# Patient Record
Sex: MALE | Race: WHITE | NOT HISPANIC OR LATINO | Employment: OTHER | URBAN - METROPOLITAN AREA
[De-identification: names, ages, dates, MRNs, and addresses within clinical notes are randomized per-mention and may not be internally consistent; named-entity substitution may affect disease eponyms.]

---

## 2017-05-04 ENCOUNTER — APPOINTMENT (OUTPATIENT)
Dept: PHYSICAL THERAPY | Facility: CLINIC | Age: 77
End: 2017-05-04
Payer: MEDICARE

## 2017-05-04 PROCEDURE — G8978 MOBILITY CURRENT STATUS: HCPCS

## 2017-05-04 PROCEDURE — 97163 PT EVAL HIGH COMPLEX 45 MIN: CPT

## 2017-05-04 PROCEDURE — G8979 MOBILITY GOAL STATUS: HCPCS

## 2017-05-10 ENCOUNTER — APPOINTMENT (OUTPATIENT)
Dept: PHYSICAL THERAPY | Facility: CLINIC | Age: 77
End: 2017-05-10
Payer: MEDICARE

## 2017-05-10 PROCEDURE — 97112 NEUROMUSCULAR REEDUCATION: CPT

## 2017-05-10 PROCEDURE — 97110 THERAPEUTIC EXERCISES: CPT

## 2017-05-12 ENCOUNTER — APPOINTMENT (OUTPATIENT)
Dept: PHYSICAL THERAPY | Facility: CLINIC | Age: 77
End: 2017-05-12
Payer: MEDICARE

## 2017-05-12 PROCEDURE — 97112 NEUROMUSCULAR REEDUCATION: CPT

## 2017-05-12 PROCEDURE — 97110 THERAPEUTIC EXERCISES: CPT

## 2017-05-15 ENCOUNTER — APPOINTMENT (OUTPATIENT)
Dept: PHYSICAL THERAPY | Facility: CLINIC | Age: 77
End: 2017-05-15
Payer: MEDICARE

## 2017-05-15 PROCEDURE — 97112 NEUROMUSCULAR REEDUCATION: CPT

## 2017-05-15 PROCEDURE — 97110 THERAPEUTIC EXERCISES: CPT

## 2017-05-17 ENCOUNTER — APPOINTMENT (OUTPATIENT)
Dept: PHYSICAL THERAPY | Facility: CLINIC | Age: 77
End: 2017-05-17
Payer: MEDICARE

## 2017-05-17 PROCEDURE — 97112 NEUROMUSCULAR REEDUCATION: CPT

## 2017-05-17 PROCEDURE — 97110 THERAPEUTIC EXERCISES: CPT

## 2017-05-22 ENCOUNTER — APPOINTMENT (OUTPATIENT)
Dept: PHYSICAL THERAPY | Facility: CLINIC | Age: 77
End: 2017-05-22
Payer: MEDICARE

## 2017-05-24 ENCOUNTER — APPOINTMENT (OUTPATIENT)
Dept: PHYSICAL THERAPY | Facility: CLINIC | Age: 77
End: 2017-05-24
Payer: MEDICARE

## 2017-06-05 ENCOUNTER — APPOINTMENT (OUTPATIENT)
Dept: PHYSICAL THERAPY | Facility: CLINIC | Age: 77
End: 2017-06-05
Payer: MEDICARE

## 2017-06-05 PROCEDURE — G8979 MOBILITY GOAL STATUS: HCPCS

## 2017-06-05 PROCEDURE — G8978 MOBILITY CURRENT STATUS: HCPCS

## 2017-06-05 PROCEDURE — 97112 NEUROMUSCULAR REEDUCATION: CPT

## 2017-06-05 PROCEDURE — 97110 THERAPEUTIC EXERCISES: CPT

## 2017-06-20 ENCOUNTER — GENERIC CONVERSION - ENCOUNTER (OUTPATIENT)
Dept: OTHER | Facility: OTHER | Age: 77
End: 2017-06-20

## 2017-06-28 ENCOUNTER — GENERIC CONVERSION - ENCOUNTER (OUTPATIENT)
Dept: OTHER | Facility: OTHER | Age: 77
End: 2017-06-28

## 2017-08-17 ENCOUNTER — APPOINTMENT (OUTPATIENT)
Dept: PHYSICAL THERAPY | Facility: CLINIC | Age: 77
End: 2017-08-17
Payer: MEDICARE

## 2017-08-17 PROCEDURE — 97163 PT EVAL HIGH COMPLEX 45 MIN: CPT | Performed by: PHYSICAL THERAPIST

## 2017-08-17 PROCEDURE — G8991 OTHER PT/OT GOAL STATUS: HCPCS | Performed by: PHYSICAL THERAPIST

## 2017-08-17 PROCEDURE — G8990 OTHER PT/OT CURRENT STATUS: HCPCS | Performed by: PHYSICAL THERAPIST

## 2017-08-21 ENCOUNTER — APPOINTMENT (OUTPATIENT)
Dept: PHYSICAL THERAPY | Facility: CLINIC | Age: 77
End: 2017-08-21
Payer: MEDICARE

## 2017-08-24 ENCOUNTER — APPOINTMENT (OUTPATIENT)
Dept: PHYSICAL THERAPY | Facility: CLINIC | Age: 77
End: 2017-08-24
Payer: MEDICARE

## 2017-09-13 ENCOUNTER — ALLSCRIPTS OFFICE VISIT (OUTPATIENT)
Dept: OTHER | Facility: OTHER | Age: 77
End: 2017-09-13

## 2017-09-18 ENCOUNTER — GENERIC CONVERSION - ENCOUNTER (OUTPATIENT)
Dept: OTHER | Facility: OTHER | Age: 77
End: 2017-09-18

## 2018-01-14 VITALS
BODY MASS INDEX: 21 KG/M2 | DIASTOLIC BLOOD PRESSURE: 87 MMHG | HEIGHT: 71 IN | RESPIRATION RATE: 16 BRPM | HEART RATE: 85 BPM | TEMPERATURE: 98.3 F | SYSTOLIC BLOOD PRESSURE: 149 MMHG | WEIGHT: 150 LBS

## 2018-01-16 NOTE — MISCELLANEOUS
Message  This pt was a recent consult with Dr Sanchez Akbar and at present to be seen again PRN  Pt's wife reports his neurologist would like the pt to have a spinal tap and she questioned if this office performed this  Informed her we do not they are performed in IR  Pt is uinsure where to go  Provided her with Central Schedule number to call to inquire if [facility] has this department        Signatures   Electronically signed by : Riri Montoya, ; Sep 18 2017  3:34PM EST                       (Author)

## 2018-09-01 ENCOUNTER — HOSPITAL ENCOUNTER (OUTPATIENT)
Facility: HOSPITAL | Age: 78
Setting detail: OBSERVATION
Discharge: NURSING, REHAB OR LONG-TERM CARE | End: 2018-09-02
Attending: EMERGENCY MEDICINE | Admitting: FAMILY MEDICINE
Payer: MEDICARE

## 2018-09-01 ENCOUNTER — APPOINTMENT (EMERGENCY)
Dept: RADIOLOGY | Facility: HOSPITAL | Age: 78
End: 2018-09-01
Payer: MEDICARE

## 2018-09-01 DIAGNOSIS — R53.1 WEAKNESS: Primary | ICD-10-CM

## 2018-09-01 DIAGNOSIS — Z98.2 S/P VP SHUNT: ICD-10-CM

## 2018-09-01 LAB
ALBUMIN SERPL BCP-MCNC: 3 G/DL (ref 3.5–5)
ALP SERPL-CCNC: 92 U/L (ref 46–116)
ALT SERPL W P-5'-P-CCNC: 25 U/L (ref 12–78)
ANION GAP SERPL CALCULATED.3IONS-SCNC: 3 MMOL/L (ref 4–13)
APTT PPP: 25 SECONDS (ref 24–33)
AST SERPL W P-5'-P-CCNC: 21 U/L (ref 5–45)
BASOPHILS # BLD AUTO: 0.03 THOUSANDS/ΜL (ref 0–0.1)
BASOPHILS NFR BLD AUTO: 1 % (ref 0–1)
BILIRUB SERPL-MCNC: 0.6 MG/DL (ref 0.2–1)
BUN SERPL-MCNC: 23 MG/DL (ref 5–25)
CALCIUM SERPL-MCNC: 9.1 MG/DL (ref 8.3–10.1)
CHLORIDE SERPL-SCNC: 105 MMOL/L (ref 100–108)
CO2 SERPL-SCNC: 32 MMOL/L (ref 21–32)
CREAT SERPL-MCNC: 1.11 MG/DL (ref 0.6–1.3)
EOSINOPHIL # BLD AUTO: 0.1 THOUSAND/ΜL (ref 0–0.61)
EOSINOPHIL NFR BLD AUTO: 2 % (ref 0–6)
ERYTHROCYTE [DISTWIDTH] IN BLOOD BY AUTOMATED COUNT: 13.2 % (ref 11.6–15.1)
GFR SERPL CREATININE-BSD FRML MDRD: 64 ML/MIN/1.73SQ M
GLUCOSE SERPL-MCNC: 142 MG/DL (ref 65–140)
GLUCOSE SERPL-MCNC: 170 MG/DL (ref 65–140)
HCT VFR BLD AUTO: 41.5 % (ref 36.5–49.3)
HGB BLD-MCNC: 13 G/DL (ref 12–17)
IMM GRANULOCYTES # BLD AUTO: 0.03 THOUSAND/UL (ref 0–0.2)
IMM GRANULOCYTES NFR BLD AUTO: 1 % (ref 0–2)
INR PPP: 0.98 (ref 0.86–1.16)
LYMPHOCYTES # BLD AUTO: 0.77 THOUSANDS/ΜL (ref 0.6–4.47)
LYMPHOCYTES NFR BLD AUTO: 13 % (ref 14–44)
MCH RBC QN AUTO: 31 PG (ref 26.8–34.3)
MCHC RBC AUTO-ENTMCNC: 31.3 G/DL (ref 31.4–37.4)
MCV RBC AUTO: 99 FL (ref 82–98)
MONOCYTES # BLD AUTO: 0.57 THOUSAND/ΜL (ref 0.17–1.22)
MONOCYTES NFR BLD AUTO: 10 % (ref 4–12)
NEUTROPHILS # BLD AUTO: 4.33 THOUSANDS/ΜL (ref 1.85–7.62)
NEUTS SEG NFR BLD AUTO: 73 % (ref 43–75)
NRBC BLD AUTO-RTO: 0 /100 WBCS
PLATELET # BLD AUTO: 227 THOUSANDS/UL (ref 149–390)
PMV BLD AUTO: 8.8 FL (ref 8.9–12.7)
POTASSIUM SERPL-SCNC: 4.1 MMOL/L (ref 3.5–5.3)
PROT SERPL-MCNC: 6.5 G/DL (ref 6.4–8.2)
PROTHROMBIN TIME: 10.3 SECONDS (ref 9.4–11.7)
RBC # BLD AUTO: 4.19 MILLION/UL (ref 3.88–5.62)
SODIUM SERPL-SCNC: 140 MMOL/L (ref 136–145)
WBC # BLD AUTO: 5.83 THOUSAND/UL (ref 4.31–10.16)

## 2018-09-01 PROCEDURE — 93005 ELECTROCARDIOGRAM TRACING: CPT

## 2018-09-01 PROCEDURE — 99219 PR INITIAL OBSERVATION CARE/DAY 50 MINUTES: CPT | Performed by: NURSE PRACTITIONER

## 2018-09-01 PROCEDURE — 80053 COMPREHEN METABOLIC PANEL: CPT | Performed by: EMERGENCY MEDICINE

## 2018-09-01 PROCEDURE — 36415 COLL VENOUS BLD VENIPUNCTURE: CPT | Performed by: EMERGENCY MEDICINE

## 2018-09-01 PROCEDURE — 85025 COMPLETE CBC W/AUTO DIFF WBC: CPT | Performed by: EMERGENCY MEDICINE

## 2018-09-01 PROCEDURE — 85610 PROTHROMBIN TIME: CPT | Performed by: EMERGENCY MEDICINE

## 2018-09-01 PROCEDURE — 82948 REAGENT STRIP/BLOOD GLUCOSE: CPT

## 2018-09-01 PROCEDURE — 85730 THROMBOPLASTIN TIME PARTIAL: CPT | Performed by: EMERGENCY MEDICINE

## 2018-09-01 PROCEDURE — 70450 CT HEAD/BRAIN W/O DYE: CPT

## 2018-09-01 RX ORDER — SACCHAROMYCES BOULARDII 250 MG
250 CAPSULE ORAL 2 TIMES DAILY
COMMUNITY

## 2018-09-01 RX ORDER — ACETAMINOPHEN 325 MG/1
650 TABLET ORAL EVERY 6 HOURS PRN
COMMUNITY

## 2018-09-01 RX ORDER — LIOTHYRONINE SODIUM 5 UG/1
5 TABLET ORAL DAILY
COMMUNITY

## 2018-09-01 RX ORDER — LOSARTAN POTASSIUM 25 MG/1
25 TABLET ORAL DAILY
COMMUNITY

## 2018-09-02 VITALS
TEMPERATURE: 98.4 F | SYSTOLIC BLOOD PRESSURE: 182 MMHG | RESPIRATION RATE: 18 BRPM | HEIGHT: 73 IN | OXYGEN SATURATION: 96 % | BODY MASS INDEX: 18.67 KG/M2 | WEIGHT: 140.87 LBS | DIASTOLIC BLOOD PRESSURE: 86 MMHG | HEART RATE: 78 BPM

## 2018-09-02 PROBLEM — M51.36 DEGENERATIVE LUMBAR DISC: Status: ACTIVE | Noted: 2017-09-13

## 2018-09-02 PROBLEM — R53.1 WEAKNESS: Status: ACTIVE | Noted: 2018-09-02

## 2018-09-02 PROBLEM — A49.8 PSEUDOMONAS INFECTION: Status: ACTIVE | Noted: 2018-07-31

## 2018-09-02 PROBLEM — R29.898 DROPPED HEAD SYNDROME: Status: ACTIVE | Noted: 2018-02-14

## 2018-09-02 PROBLEM — M48.02 CERVICAL SPINAL STENOSIS: Status: ACTIVE | Noted: 2017-09-13

## 2018-09-02 PROBLEM — I10 HYPERTENSION: Status: ACTIVE | Noted: 2017-09-13

## 2018-09-02 PROBLEM — G91.2 NPH (NORMAL PRESSURE HYDROCEPHALUS) (HCC): Status: ACTIVE | Noted: 2018-07-23

## 2018-09-02 PROBLEM — E03.9 HYPOTHYROIDISM: Status: ACTIVE | Noted: 2018-07-23

## 2018-09-02 PROBLEM — G20 PRIMARY PARKINSONISM (HCC): Status: ACTIVE | Noted: 2018-02-14

## 2018-09-02 LAB
ANION GAP SERPL CALCULATED.3IONS-SCNC: 5 MMOL/L (ref 4–13)
BILIRUB UR QL STRIP: NEGATIVE
BUN SERPL-MCNC: 18 MG/DL (ref 5–25)
CALCIUM SERPL-MCNC: 9 MG/DL (ref 8.3–10.1)
CHLORIDE SERPL-SCNC: 105 MMOL/L (ref 100–108)
CHOLEST SERPL-MCNC: 169 MG/DL (ref 50–200)
CLARITY UR: CLEAR
CO2 SERPL-SCNC: 30 MMOL/L (ref 21–32)
COLOR UR: YELLOW
CREAT SERPL-MCNC: 0.81 MG/DL (ref 0.6–1.3)
ERYTHROCYTE [DISTWIDTH] IN BLOOD BY AUTOMATED COUNT: 12.8 % (ref 11.6–15.1)
EST. AVERAGE GLUCOSE BLD GHB EST-MCNC: 117 MG/DL
GFR SERPL CREATININE-BSD FRML MDRD: 86 ML/MIN/1.73SQ M
GLUCOSE SERPL-MCNC: 104 MG/DL (ref 65–140)
GLUCOSE UR STRIP-MCNC: NEGATIVE MG/DL
HBA1C MFR BLD: 5.7 % (ref 4.2–6.3)
HCT VFR BLD AUTO: 39.7 % (ref 36.5–49.3)
HDLC SERPL-MCNC: 74 MG/DL (ref 40–60)
HGB BLD-MCNC: 12.6 G/DL (ref 12–17)
HGB UR QL STRIP.AUTO: NEGATIVE
KETONES UR STRIP-MCNC: NEGATIVE MG/DL
LDLC SERPL CALC-MCNC: 89 MG/DL (ref 0–100)
LEUKOCYTE ESTERASE UR QL STRIP: NEGATIVE
MCH RBC QN AUTO: 30.8 PG (ref 26.8–34.3)
MCHC RBC AUTO-ENTMCNC: 31.7 G/DL (ref 31.4–37.4)
MCV RBC AUTO: 97 FL (ref 82–98)
NITRITE UR QL STRIP: NEGATIVE
PH UR STRIP.AUTO: 7.5 [PH] (ref 5–9)
PLATELET # BLD AUTO: 222 THOUSANDS/UL (ref 149–390)
PMV BLD AUTO: 9.1 FL (ref 8.9–12.7)
POTASSIUM SERPL-SCNC: 3.8 MMOL/L (ref 3.5–5.3)
PROT UR STRIP-MCNC: NEGATIVE MG/DL
RBC # BLD AUTO: 4.09 MILLION/UL (ref 3.88–5.62)
SODIUM SERPL-SCNC: 140 MMOL/L (ref 136–145)
SP GR UR STRIP.AUTO: 1.02 (ref 1–1.03)
TRIGL SERPL-MCNC: 30 MG/DL
UROBILINOGEN UR QL STRIP.AUTO: 1 E.U./DL
WBC # BLD AUTO: 5.61 THOUSAND/UL (ref 4.31–10.16)

## 2018-09-02 PROCEDURE — 83036 HEMOGLOBIN GLYCOSYLATED A1C: CPT | Performed by: NURSE PRACTITIONER

## 2018-09-02 PROCEDURE — 81003 URINALYSIS AUTO W/O SCOPE: CPT | Performed by: NURSE PRACTITIONER

## 2018-09-02 PROCEDURE — 80048 BASIC METABOLIC PNL TOTAL CA: CPT | Performed by: NURSE PRACTITIONER

## 2018-09-02 PROCEDURE — 92610 EVALUATE SWALLOWING FUNCTION: CPT

## 2018-09-02 PROCEDURE — G8997 SWALLOW GOAL STATUS: HCPCS

## 2018-09-02 PROCEDURE — 97163 PT EVAL HIGH COMPLEX 45 MIN: CPT

## 2018-09-02 PROCEDURE — 99285 EMERGENCY DEPT VISIT HI MDM: CPT

## 2018-09-02 PROCEDURE — 97110 THERAPEUTIC EXERCISES: CPT

## 2018-09-02 PROCEDURE — G8979 MOBILITY GOAL STATUS: HCPCS

## 2018-09-02 PROCEDURE — 85027 COMPLETE CBC AUTOMATED: CPT | Performed by: NURSE PRACTITIONER

## 2018-09-02 PROCEDURE — G8978 MOBILITY CURRENT STATUS: HCPCS

## 2018-09-02 PROCEDURE — G8988 SELF CARE GOAL STATUS: HCPCS

## 2018-09-02 PROCEDURE — 97167 OT EVAL HIGH COMPLEX 60 MIN: CPT

## 2018-09-02 PROCEDURE — 87081 CULTURE SCREEN ONLY: CPT | Performed by: NURSE PRACTITIONER

## 2018-09-02 PROCEDURE — 80061 LIPID PANEL: CPT | Performed by: NURSE PRACTITIONER

## 2018-09-02 PROCEDURE — G8996 SWALLOW CURRENT STATUS: HCPCS

## 2018-09-02 PROCEDURE — G8987 SELF CARE CURRENT STATUS: HCPCS

## 2018-09-02 PROCEDURE — 99217 PR OBSERVATION CARE DISCHARGE MANAGEMENT: CPT | Performed by: FAMILY MEDICINE

## 2018-09-02 PROCEDURE — 97535 SELF CARE MNGMENT TRAINING: CPT

## 2018-09-02 RX ORDER — HEPARIN SODIUM 5000 [USP'U]/ML
5000 INJECTION, SOLUTION INTRAVENOUS; SUBCUTANEOUS EVERY 8 HOURS SCHEDULED
Status: DISCONTINUED | OUTPATIENT
Start: 2018-09-02 | End: 2018-09-02

## 2018-09-02 RX ORDER — ACETAMINOPHEN 325 MG/1
650 TABLET ORAL EVERY 6 HOURS PRN
Status: DISCONTINUED | OUTPATIENT
Start: 2018-09-02 | End: 2018-09-02 | Stop reason: HOSPADM

## 2018-09-02 RX ORDER — ATORVASTATIN CALCIUM 40 MG/1
40 TABLET, FILM COATED ORAL EVERY EVENING
Status: DISCONTINUED | OUTPATIENT
Start: 2018-09-02 | End: 2018-09-02 | Stop reason: HOSPADM

## 2018-09-02 RX ORDER — UBIDECARENONE 75 MG
100 CAPSULE ORAL DAILY
Status: DISCONTINUED | OUTPATIENT
Start: 2018-09-02 | End: 2018-09-02 | Stop reason: HOSPADM

## 2018-09-02 RX ORDER — LIOTHYRONINE SODIUM 5 UG/1
5 TABLET ORAL DAILY
Status: DISCONTINUED | OUTPATIENT
Start: 2018-09-02 | End: 2018-09-02 | Stop reason: HOSPADM

## 2018-09-02 RX ORDER — ASPIRIN 81 MG/1
81 TABLET, CHEWABLE ORAL DAILY
Status: DISCONTINUED | OUTPATIENT
Start: 2018-09-02 | End: 2018-09-02

## 2018-09-02 RX ORDER — SACCHAROMYCES BOULARDII 250 MG
250 CAPSULE ORAL 2 TIMES DAILY
Status: DISCONTINUED | OUTPATIENT
Start: 2018-09-02 | End: 2018-09-02 | Stop reason: HOSPADM

## 2018-09-02 RX ORDER — ASPIRIN 81 MG/1
81 TABLET ORAL DAILY
Qty: 30 TABLET | Refills: 0 | Status: SHIPPED | OUTPATIENT
Start: 2018-09-02

## 2018-09-02 RX ORDER — LOSARTAN POTASSIUM 25 MG/1
25 TABLET ORAL ONCE
Status: COMPLETED | OUTPATIENT
Start: 2018-09-02 | End: 2018-09-02

## 2018-09-02 RX ORDER — HEPARIN SODIUM 5000 [USP'U]/ML
5000 INJECTION, SOLUTION INTRAVENOUS; SUBCUTANEOUS EVERY 12 HOURS SCHEDULED
Status: DISCONTINUED | OUTPATIENT
Start: 2018-09-02 | End: 2018-09-02 | Stop reason: HOSPADM

## 2018-09-02 RX ORDER — LOSARTAN POTASSIUM 25 MG/1
25 TABLET ORAL DAILY
Status: DISCONTINUED | OUTPATIENT
Start: 2018-09-02 | End: 2018-09-02 | Stop reason: HOSPADM

## 2018-09-02 RX ORDER — LEVOTHYROXINE SODIUM 0.05 MG/1
50 TABLET ORAL
Status: DISCONTINUED | OUTPATIENT
Start: 2018-09-02 | End: 2018-09-02 | Stop reason: HOSPADM

## 2018-09-02 RX ADMIN — LOSARTAN POTASSIUM 25 MG: 25 TABLET, FILM COATED ORAL at 11:58

## 2018-09-02 RX ADMIN — LIOTHYRONINE SODIUM 5 MCG: 5 TABLET ORAL at 09:18

## 2018-09-02 RX ADMIN — LOSARTAN POTASSIUM 25 MG: 25 TABLET ORAL at 05:25

## 2018-09-02 RX ADMIN — Medication 250 MG: at 09:18

## 2018-09-02 RX ADMIN — LEVOTHYROXINE SODIUM 50 MCG: 50 TABLET ORAL at 05:25

## 2018-09-02 RX ADMIN — HEPARIN SODIUM 5000 UNITS: 5000 INJECTION, SOLUTION INTRAVENOUS; SUBCUTANEOUS at 09:18

## 2018-09-02 RX ADMIN — VITAM B12 100 MCG: 100 TAB at 09:18

## 2018-09-02 NOTE — PLAN OF CARE
DISCHARGE PLANNING - CARE MANAGEMENT     Discharge to post-acute care or home with appropriate resources Adequate for Discharge        Potential for Falls     Patient will remain free of falls Adequate for Discharge        Prexisting or High Potential for Compromised Skin Integrity     Skin integrity is maintained or improved Adequate for Discharge

## 2018-09-02 NOTE — [place] UNIVERSAL TRANSFER FORM
[place] UNIV[place]AL TRANSFER FORM  (ALL ITEMS MUST BE COMPLETED)    1  TRANSFER FROM: [address]      TRANSFER TO: [facility]    2  DATE OF TRANSFER: 9/2/2018                        TIME OF TRANSFER: 1630    3  PATIENT NAME: Patricia Stern      YOB: 1940                             GENDER: male    4  LANGUAGE:   English    5  PHYSICIAN NAME:  Yesi Rosales DO                   PHONE: [phone]    6  CODE STATUS: Level 1 - Full Code        Out of Hospital DNR Attached: No    7  :                                      :  Extended Emergency Contact Information  Primary Emergency Contact: Adelita Cheek  Address: [place] [address] [address] Phone: [phone]  Mobile Phone: [phone]  Relation: Kyung Plasencia [facility] Representative/Proxy:  No           Legal Guardian:  No             NAME OF:           HEALTH CARE REPRESENTATIVE/PROXY:                                         OR           LEGAL GUARDIAN, IF NOT :                                               PHONE:  (Day)           (Night)                        (Cell)    8  REASON FOR TRANSFER: (Must include brief medical history and recent changes in physical function or cognition ) return to rehab            V/S: /85 (BP Location: Right arm)   Pulse 86   Temp 98 2 °F (36 8 °C) (Axillary)   Resp 18   Ht 6' 1" (1 854 m)   Wt 63 9 kg (140 lb 14 oz)   SpO2 97%   BMI 18 59 kg/m²           PAIN: None    9  PRIMARY DIAGNOSIS: Weakness      Secondary Diagnosis:         Pacemaker: No      Internal Defib: No          Mental Health Diagnosis (if Applicable):    10  RESTRAINTS: No     11  RESPIRATORY NEEDS: None    12  ISOLATION/PRECAUTION: None    13  ALLERGY: Propranolol    14  SENSORY:       Hearing Poor    15  SKIN CONDITION: Yes:  Pressure    16  DIET: Regular    17  IV ACCESS: None    18  PERSONAL ITEMS SENT WITH PATIENT: Glasses    19  ATTACHED DOCUMENTS: MUST ATTACH CURRENT MEDICATION INFORMATION Face Sheet, MAR and Discharge Summary    20  AT RISK ALERTS:Falls and Pressure Ulcer        HARM TO: N/A    21  WEIGHT BEARING STATUS:         Left Leg: None        Right Leg: None    22  MENTAL STATUS:Forgetful and Disoriented    23  FUNCTION:        Walk: With Help        Transfer: With Help        Toilet: With Help        Feed: With Help    24  IMMUNIZATIONS/SCREENING:     There is no immunization history on file for this patient  25  BOWEL: Incontinent     26  BLADDER: Incontinent    27   SENDING FACILITY CONTACT: [facility]                   Title: RN        Unit: [address]        Phone: [phone] (if known):        Title:        Unit:         Phone:         FORM PREFILLED BY (if applicable)       Title:       Unit:        Phone:         FORM COMPLETED BY Jung Mcmahan RN      Title: RN      Phone: [phone]

## 2018-09-02 NOTE — ASSESSMENT & PLAN NOTE
· Status post  shunt in July  · Neurosurgery is Dr Valeriy Ruiz from [facility] Neurosurgical specialist  · As per CT,  shunt is in place and no evidence of hydrocephalus at this time

## 2018-09-02 NOTE — PLAN OF CARE
Problem: DISCHARGE PLANNING - CARE MANAGEMENT  Goal: Discharge to post-acute care or home with appropriate resources  INTERVENTIONS:  - Conduct assessment to determine patient/family and health care team treatment goals, and need for post-acute services based on payer coverage, community resources, and patient preferences, and barriers to discharge  - Address psychosocial, clinical, and financial barriers to discharge as identified in assessment in conjunction with the patient/family and health care team  - Arrange appropriate level of post-acute services according to patient's   needs and preference and payer coverage in collaboration with the physician and health care team  - Communicate with and update the patient/family, physician, and health care team regarding progress on the discharge plan  - Arrange appropriate transportation to post-acute venues  Return to [address] [place] [address] for SNF/STR  Outcome: Adequate for Discharge

## 2018-09-02 NOTE — DISCHARGE SUMMARY
Discharge Summary - [facility] Internal Medicine    Patient Information: Maximiliano Paris  69 y o  male MRN: [id number]  Unit/Bed#: [address] [place] [address] Encounter: [id number]    Discharging Physician / Practitioner: Ella Stanford DO  PCP: Emily Kaplan MD  Admission Date: 9/1/2018  Discharge Date: 09/02/18    Reason for Admission: Weakness - Generalized (pt sent in from NH wife told staff patients hand grasp was weaker than normal, started about 40 mins ago, wife states patient seems back to baseline now )      Discharge Diagnoses:     Principal Problem:    Weakness  Active Problems:    NPH (normal pressure hydrocephalus)    Essential hypertension    Hypothyroidism  Resolved Problems:    * No resolved hospital problems  *        * Weakness   Assessment & Plan    · As per wife patient did not respond, right hand seemed floppy but this resolved on its own  Unclear if the patient just took a little longer to respond to his wife since he does have weakness at baseline vs momentary worsening of his baseline weakness as his symptoms were already better when seen by nursing home staff  · CT of the head with no acute finding  · Discussed case with Neurology on call - Dr Juliano Mckeon and patient started on 81 mg of ASA  LDL of 89 and no need for statin as per him  · Neuro not available today and patient's wife wants him discharged as he is at his baseline rather than stay in the hospital for other pending tests  · Patient had a recent echo at [place] as per wife  Get carotid Dopplers, MRI brain as outpatient  · Urinalysis neg for UTI        NPH (normal pressure hydrocephalus)   Assessment & Plan    · Status post  shunt in July  · Neurosurgery is Dr Rosa Grover from [address] [place] [address] and has appointment on Wed    · As per CT,  shunt is in place and no evidence of hydrocephalus at this time        Hypothyroidism   Assessment & Plan    Continue thyroid armour, cytomel        Essential hypertension   Assessment & Plan    · Continue losartan            Consultations During Hospital Stay:  [address]  IP CONSULT TO NUTRITION SERVICES  IP CONSULT TO CASE MANAGEMENT    Procedures Performed:     · none    Significant Findings:     · See hospital course and above    Imaging while in hospital:    Ct Head Without Contrast    Result Date: 9/1/2018  Narrative: CT BRAIN - WITHOUT CONTRAST INDICATION:   Weakness  Past history of hydrocephalus  COMPARISON:  None  TECHNIQUE:  CT examination of the brain was performed  In addition to axial images, coronal 2D reformatted images were created and submitted for interpretation  Radiation dose length product (DLP) for this visit:  1347 25 mGy-cm   This examination, like all CT scans performed in the [facility], was performed utilizing techniques to minimize radiation dose exposure, including the use of iterative reconstruction and automated exposure control  IMAGE QUALITY:  Diagnostic  FINDINGS: PARENCHYMA:  There is no evidence of acute intracranial hemorrhage, mass effect or midline shift  Questionable extremely thin left subdural hygroma  VENTRICLES AND EXTRA-AXIAL SPACES:  Ventriculoperitoneal shunt identified with its tip in the right lateral ventricle  No prior studies for comparison however the ventricles do not appear dilated in comparison to the cortical sulci  There is no transependymal migration of fluid  VISUALIZED ORBITS AND PARANASAL SINUSES:  Left maxillary sinus partially opacified secondary to mucosal thickening  Opacified right ethmoid air cell  No air-fluid levels  CALVARIUM AND EXTRACRANIAL SOFT TISSUES:  Normal      Impression: No acute intracranial abnormality   Workstation performed: [id number]       Incidental Findings:   · none    Test Results Pending at Discharge (will require follow up):   · As per After Visit Summary     Outpatient Tests Requested:  · MRI brain, Carotid doppler    Complications:  See hospital course and above    Hospital Course:     Isi Espinoza  is a 69 y o  male patient who originally presented to the hospital on 9/1/2018 due to weakness  At patient has history of NPH status post  shunt on July 30th and is at [facility] for rehabilitation following prolonged hospitalization  Patient was helping her  floss when he stated at her and did not really respond when she asked him to take something out of her hands  patient's wife states that his right hand seemed floppy  She became concerned that either he was not understanding or was confused or was unable to move his arm  She brought him out to the hallway where he was examined by the nursing staff and neurological evaluation was normal as per wife  Patient's wife also states that he was back at his baseline even prior to arrival to the ER  PAtient was admitted and monitored overnight  Wife persistent that patient is back at baseline and was only admitted to get carotid ultrasound  She wants patient to go back to NH and get work up done as outpatient  Please see above list of diagnoses and related plan for additional information  Condition at Discharge: stable     Discharge Day Visit / Exam:     Subjective:  As per wife patient is back at his baseline  Patient denies any pain    Vitals: Blood Pressure: (!) 182/86 (09/02/18 1641)  Pulse: 78 (09/02/18 1641)  Temperature: 98 4 °F (36 9 °C) (09/02/18 1641)  Temp Source: Oral (09/02/18 1641)  Respirations: 18 (09/02/18 1641)  Height: 6' 1" (185 4 cm) (09/02/18 0113)  Weight - Scale: 63 9 kg (140 lb 14 oz) (09/02/18 0113)  SpO2: 96 % (09/02/18 1641)  Exam:   Physical Exam   Constitutional: No distress  HENT:   Head: Normocephalic and atraumatic  Eyes: EOM are normal  Right eye exhibits no discharge  Left eye exhibits no discharge  No scleral icterus  Cardiovascular: Normal rate and regular rhythm  Pulmonary/Chest: No respiratory distress   He has no wheezes  He has no rales  Decreased breath sounds bilaterally although patient did have poor inspiratory effort   Abdominal: Soft  Bowel sounds are normal  He exhibits no distension  There is no tenderness  Musculoskeletal: He exhibits no edema  Neurological: He is alert  He exhibits abnormal muscle tone  Coordination abnormal    Answer some simple questions  Speech is slow Difficulty understanding speech at times  Bilateral upper extremity are weak but equal  3/5 motor strength B/L L E   Skin: He is not diaphoretic  Discharge instructions/Information to patient and family:(Discharge Medications and Follow up):   See after visit summary for information provided to patient and family  Provisions for Follow-Up Care:  See after visit summary for information related to follow-up care and any pertinent home health orders  Disposition:  Back to [facility]    Planned Readmission:  No     Discharge Statement:  I spent 35 minutes discharging the patient  This time was spent on the day of discharge  I had direct contact with the patient on the day of discharge  Greater than 50% of the total time was spent examining patient, answering all patient questions, arranging and discussing plan of care with patient as well as directly providing post-discharge instructions  Additional time then spent on discharge activities  Discharge Medications:  See after visit summary for reconciled discharge medications provided to patient and family  ** Please Note:  Dictation voice to text software may have been used in the creation of this document   **

## 2018-09-02 NOTE — OCCUPATIONAL THERAPY NOTE
Occupational Therapy Evaluation/Treatment     09/02/18 0818   Note Type   Note type Eval/Treat   Restrictions/Precautions   Other Precautions Cognitive; Chair Alarm; Bed Alarm; Fall Risk   Pain Assessment   Pain Assessment No/denies pain   Home Living   Type of Home (admitted from STR/[facility]L)   [address] [facility]  (transport chair )   Prior Function   Level of [place] Needs assistance with ADLs and functional mobility; Needs assistance with IADLs  (uses walker assist)   Lives With Facility staff  (lives with wife )   Receives Help From Personal care attendant   ADL Assistance Needs assistance   IADLs Needs assistance   ADL   Eating Assistance 3  Moderate Assistance   Grooming Assistance 3  Moderate Assistance   UB Bathing Assistance 2  Maximal Assistance   LB Bathing Assistance 2  Maximal Assistance   UB Dressing Assistance 2  Maximal Assistance   LB Dressing Assistance 2  Maximal [address]  2  Maximal Assistance   Bed Mobility   Supine to Sit 2  Maximal assistance   Additional items Assist x 1   Transfers   Sit to Stand 2  Maximal assistance   Additional items Assist x 2   Stand to Sit 2  Maximal assistance   Additional items Assist x 2   Stand pivot 2  Maximal assistance   Additional items Assist x 2   Functional Mobility   Functional Mobility 2  Maximal assistance   Additional Comments assist of 2 with walker    Additional items Rolling walker   Balance   Static Sitting Fair -   Dynamic Sitting Poor   Static Standing Poor   Dynamic Standing Poor   Activity Tolerance   Activity Tolerance Treatment limited secondary to medical complications (Comment)   Nurse Made Aware yes   RUE Assessment   RUE Assessment WFL  (grossly 3+/5)   LUE Assessment   LUE Assessment WFL  (grossly 3+/5)   Hand Function   Gross Motor Coordination Impaired   Fine Motor Coordination Impaired, tremors BUE   Cognition   Overall Cognitive Status Impaired   Arousal/Participation Cooperative   Attention Attends with cues to redirect   Orientation Level Oriented to person;Disoriented to place; Disoriented to time;Disoriented to situation   Following Commands Follows one step commands inconsistently   Comments slow to respond, flat affect    Assessment   Limitation Decreased ADL status; Decreased UE strength;Decreased Safe judgement during ADL;Decreased endurance;Decreased self-care trans;Decreased high-level ADLs  (decreased balance and mobility )   Prognosis Good   Assessment Patient evaluated by Occupational Therapy  Patient admitted with Weakness  The patients occupational profile, medical and therapy history includes a extensive additional review of physical, cognitive, or psychosocial history related to current functional performance  Comorbidities affecting functional mobility and ADLS include:hydrocephalus, CSF shunt, lumbar laminectomy, dysphagia and hypertension  Prior to admission, patient was requiring assist for functional mobility with walker, requiring assist for ADLS, requiring assist for IADLS and in short term rehab  The evaluation identifies the following performance deficits: weakness, decreased ROM, impaired balance, decreased endurance, decreased coordination, increased fall risk, new onset of impairment of functional mobility, decreased ADLS, decreased IADLS, decreased activity tolerance, decreased safety awareness, impaired judgement, decreased cognition and decreased strength, that result in activity limitations and/or participation restrictions  This evaluation requires clinical decision making of high complexity, because the patient presents with comorbidites that affect occupational performance and required significant modification of tasks or assistance with consideration of multiple treatment options  The Barthel Index was used as a functional outcome tool presenting with a score of 25, indicating marked limitations of functional mobility and ADLS    Patient will benefit from skilled Occupational Therapy services to address above deficits and facilitate a safe return to prior level of function  Goals   Patient Goals unable to state due to cognitive impairment    STG Time Frame (1-7 days)   Short Term Goal  Patient will increase standing tolerance to 3 minutes during ADL task to decrease assistance level and decrease fall risk; Patient will increase bed mobility to mod assist in preparation for ADLS and transfers; Patient will increase functional mobility to and from bathroom with rolling walker with mod assist of 2 to increase performance with ADLS and to use a toilet; Patient will tolerate 10 minutes of UE ROM/strengthening to increase general activity tolerance and performance in ADLS/IADLS; Patient will improve functional activity tolerance to 10 minutes of sustained functional tasks to increase participation in basic self-care and decrease assistance level;   Patient will increase dynamic sitting balance to poor+ to improve the ability to sit at edge of bed or on a chair for ADLS;  Patient will increase dynamic standing balance to poor+ to improve postural stability and decrease fall risk during standing ADLS and transfers  LTG Time Frame (8-14 days)   Long Term Goal Patient will increase standing tolerance to 6 minutes during ADL task to decrease assistance level and decrease fall risk; Patient will increase bed mobility to min assist in preparation for ADLS and transfers;  Patient will increase functional mobility to and from bathroom with rolling walker with mod assist to increase performance with ADLS and to use a toilet; Patient will tolerate 20 minutes of UE ROM/strengthening to increase general activity tolerance and performance in ADLS/IADLS; Patient will improve functional activity tolerance to 20 minutes of sustained functional tasks to increase participation in basic self-care and decrease assistance level;   Patient will increase dynamic sitting balance to fair- to improve the ability to sit at edge of bed or on a chair for ADLS;  Patient will increase dynamic standing balance to fair- to improve postural stability and decrease fall risk during standing ADLS and transfers  Functional Transfer Goals   Pt Will Perform All Functional Transfers (STG mod assist x 2 LTG mod assist )   ADL Goals   Pt Will Perform Eating (STG min assist LTG supervision )   Pt Will Perform Grooming (STG min assist LTG supervision )   Pt Will Perform Bathing (STG mod assist LTG min assist )   Pt Will Perform UE Dressing (STG mod assist LTG min assist )   Pt Will Perform LE Dressing (STG mod assist LTG min assist )   Pt Will Perform Toileting (STG mod assist LTG min assist )   Plan   Treatment Interventions ADL retraining;Functional transfer training;UE strengthening/ROM; Endurance training;Patient/family training;Equipment evaluation/education; Activityengagement; Energy conservation;Cognitive reorientation   Goal Expiration Date 09/16/18   Treatment Day 1   OT Frequency 3-5x/wk   Additional Treatment Session   Start Time 0825   End Time 1175   Treatment Assessment Patient completed BUE AROM 10 times each for shoulder flexion and elbow flexion with demonstration and hand over hand assist to initiate  Patient completed commode transfer with max assist of 2  Urination on commode  Patient with flexed posture seated on commode trying to stand/releive pressure on buttocks throghout, supervision required  Patient is generally weak and deconditioned  Limited by cognition  Patient requires max assist for ADLS and toileting      Recommendation   OT Discharge Recommendation Short Term Rehab   Barthel Index   Feeding 0   Bathing 0   Grooming Score 0   Dressing Score 0   Bladder Score 5   Bowels Score 10   Toilet Use Score 5   Transfers (Bed/Chair) Score 5   Mobility (Level Surface) Score 0   Stairs Score 0   Barthel Index Score 25   Licensure   [place] License Number  Susie Tierney MS OTR/L [id number]

## 2018-09-02 NOTE — ED PROVIDER NOTES
History  Chief Complaint   Patient presents with    Weakness - Generalized     pt sent in from NH wife told staff patients hand grasp was weaker than normal, started about 40 mins ago, wife states patient seems back to baseline now  Patient had a recent placement of a  shunt for hydrocephalus  He is in rehab at a nursing center now  Patient comes in because his wife notified the staff that she thought his right hand grasp felt weaker than normal   She also thought that the patient seemed less alert than normal  Patient is poorly verbal but does follow some commands  Wife explains that in the 40 minutes since the onset of symptoms he seems to be better by the time of arrival   There has been no fever and no recent head injury            Prior to Admission Medications   Prescriptions Last Dose Informant Patient Reported? Taking?   acetaminophen (TYLENOL) 325 mg tablet   Yes Yes   Sig: Take 650 mg by mouth every 6 (six) hours as needed for mild pain   cyanocobalamin 1000 MCG tablet   Yes Yes   Sig: Take 100 mcg by mouth daily   liothyronine (CYTOMEL) 5 mcg tablet   Yes Yes   Sig: Take 5 mcg by mouth daily   losartan (COZAAR) 25 mg tablet   Yes Yes   Sig: Take 25 mg by mouth daily   saccharomyces boulardii (FLORASTOR) 250 mg capsule   Yes Yes   Sig: Take 250 mg by mouth 2 (two) times a day   thyroid (ARMOUR) 32 5 MG tablet   Yes Yes   Sig: Take 32 5 mg by mouth daily      Facility-Administered Medications: None       Past Medical History:   Diagnosis Date    Disease of thyroid gland     Dysphagia     Hydrocephalus     Hypertension     Muscle weakness        Past Surgical History:   Procedure Laterality Date    CSF SHUNT         History reviewed  No pertinent family history  I have reviewed and agree with the history as documented      Social History   Substance Use Topics    Smoking status: Not on file    Smokeless tobacco: Not on file    Alcohol use No        Review of Systems   Constitutional: Negative for chills and fever  HENT: Negative for sore throat  Respiratory: Negative for cough  Cardiovascular: Negative for chest pain  Gastrointestinal: Negative for abdominal pain and vomiting  Genitourinary: Negative for dysuria  Musculoskeletal: Positive for gait problem  Neurological: Positive for speech difficulty and weakness  Negative for seizures, syncope and headaches  All other systems reviewed and are negative  Physical Exam  Physical Exam   Constitutional: He appears well-developed and well-nourished  HENT:   Mouth/Throat: Oropharynx is clear and moist    Patient has a  shunt present it seems to have a reservoir  Eyes: Conjunctivae and EOM are normal    Neck: Normal range of motion  Neck supple  Cardiovascular: Normal rate, regular rhythm and normal heart sounds  Pulmonary/Chest: Effort normal and breath sounds normal    Abdominal: Soft  Bowel sounds are normal    Musculoskeletal: He exhibits no edema  Patient has equal strength in the upper  extremities without appreciable difference in grasp  Both lower extremities are weak versus gravity   Neurological: He is alert  Patient is poorly verbal but answer simple questions   Skin: Skin is warm and dry  Psychiatric: His behavior is normal    Nursing note and vitals reviewed        Vital Signs  ED Triage Vitals   Temperature Pulse Respirations Blood Pressure SpO2   09/01/18 2048 09/01/18 2045 09/01/18 2045 09/01/18 2048 09/01/18 2045   98 4 °F (36 9 °C) 72 21 (!) 187/86 98 %      Temp Source Heart Rate Source Patient Position - Orthostatic VS BP Location FiO2 (%)   09/01/18 2048 09/01/18 2048 09/01/18 2048 09/01/18 2048 --   Oral Monitor Lying Left arm       Pain Score       09/01/18 2048       No Pain           Vitals:    09/01/18 2100 09/01/18 2110 09/01/18 2115 09/01/18 2130   BP:  (!) 179/91 (!) 179/91 (!) 176/79   Pulse: 70  74 70   Patient Position - Orthostatic VS:           Visual Acuity      ED Medications  Medications - No data to display    Diagnostic Studies  Results Reviewed     Procedure Component Value Units Date/Time    Comprehensive metabolic panel [[id number]]  (Abnormal) Collected:  09/01/18 2105    Lab Status:  Final result Specimen:  Blood from Arm, Left Updated:  09/01/18 2134     Sodium 140 mmol/L      Potassium 4 1 mmol/L      Chloride 105 mmol/L      CO2 32 mmol/L      ANION GAP 3 (L) mmol/L      BUN 23 mg/dL      Creatinine 1 11 mg/dL      Glucose 142 (H) mg/dL      Calcium 9 1 mg/dL      AST 21 U/L      ALT 25 U/L      Alkaline Phosphatase 92 U/L      Total Protein 6 5 g/dL      Albumin 3 0 (L) g/dL      Total Bilirubin 0 60 mg/dL      eGFR 64 ml/min/1 73sq m     Narrative:         National Kidney Disease Education Program recommendations are as follows:  GFR calculation is accurate only with a steady state creatinine  Chronic Kidney disease less than 60 ml/min/1 73 sq  meters  Kidney failure less than 15 ml/min/1 73 sq  meters      Protime-INR [[id number]]  (Normal) Collected:  09/01/18 2105    Lab Status:  Final result Specimen:  Blood from Arm, Left Updated:  09/01/18 2132     Protime 10 3 seconds      INR 0 98    APTT [[id number]]  (Normal) Collected:  09/01/18 2105    Lab Status:  Final result Specimen:  Blood from Arm, Left Updated:  09/01/18 2132     PTT 25 seconds     CBC and differential [[id number]]  (Abnormal) Collected:  09/01/18 2105    Lab Status:  Final result Specimen:  Blood from Arm, Left Updated:  09/01/18 2116     WBC 5 83 Thousand/uL      RBC 4 19 Million/uL      Hemoglobin 13 0 g/dL      Hematocrit 41 5 %      MCV 99 (H) fL      MCH 31 0 pg      MCHC 31 3 (L) g/dL      RDW 13 2 %      MPV 8 8 (L) fL      Platelets 793 Thousands/uL      nRBC 0 /100 WBCs      Neutrophils Relative 73 %      Immat GRANS % 1 %      Lymphocytes Relative 13 (L) %      Monocytes Relative 10 %      Eosinophils Relative 2 %      Basophils Relative 1 %      Neutrophils Absolute 4 33 Thousands/µL Immature Grans Absolute 0 03 Thousand/uL      Lymphocytes Absolute 0 77 Thousands/µL      Monocytes Absolute 0 57 Thousand/µL      Eosinophils Absolute 0 10 Thousand/µL      Basophils Absolute 0 03 Thousands/µL     Fingerstick Glucose (POCT) [[id number]]  (Abnormal) Collected:  09/01/18 2041    Lab Status:  Final result Updated:  09/01/18 2043     POC Glucose 170 (H) mg/dl                  CT head without contrast   Final Result by Wilber Diaz MD (09/01 2214)      No acute intracranial abnormality  Workstation performed: [id number]                    Procedures  Procedures       Phone Contacts  ED Phone Contact    ED Course                               MDM  Number of Diagnoses or Management Options  Diagnosis management comments: CT of the head for possible  shunt malfunction and/or increased hydrocephalus  CT reviewed  There is no appreciable hydrocephalus, shunt appears to be function  Patient may have had a neurovascular episode  Bring in for observation carotid Doppler examination    CritCare Time    Disposition  Final diagnoses:   Weakness   S/P  shunt     Time reflects when diagnosis was documented in both MDM as applicable and the Disposition within this note     Time User Action Codes Description Comment    9/1/2018 11:32 PM Le SHERMAN Add [R53 1] Weakness     9/1/2018 11:32 PM Martha Jackson Add [Z98 2] S/P  shunt       ED Disposition     ED Disposition Condition Comment    Admit  Case was discussed with SANDRO Anne and the patient's admission status was agreed to be Admission Status: observation status to the service of Dr Flavia Parisi   Follow-up Information    None         Patient's Medications   Discharge Prescriptions    No medications on file     No discharge procedures on file      ED Provider  Electronically Signed by           Job Burnham MD  09/01/18 9634

## 2018-09-02 NOTE — SPEECH THERAPY NOTE
Speech Language/Pathology  Bedside Swallowing Evaluation    Patient Name: Abigail Garcia Sr  Today's Date: 9/2/2018     Problem List  Patient Active Problem List   Diagnosis    Cervical spinal stenosis    Degenerative lumbar disc    Dropped head syndrome    Hypertension    Hypothyroidism    NPH (normal pressure hydrocephalus)    Primary Parkinsonism ([address] )    Pseudomonas infection    Weakness     Past Medical History  Past Medical History:   Diagnosis Date    Disease of thyroid gland     Dysphagia     Hydrocephalus     Hypertension     Muscle weakness      Past Surgical History  Past Surgical History:   Procedure Laterality Date    CSF SHUNT      HERNIA REPAIR      x 2    LUMBAR LAMINECTOMY      ROTATOR CUFF REPAIR Left         09/02/18 0808   Swallow Information   Current Risks for Dysphagia & Aspiration New Neuro event   Current Symptoms/Concerns (Determine safety of starting PO intake )   Current Diet NPO   Baseline Diet Regular; Thin liquids   Baseline Assessment   Behavior/Cognition Alert; Cooperative;Lethargic   Speech/Language Status Dysarthric, minimal verbal expression  slow to respond  Patient Positioning Upright in chair   Swallow Mechanism Exam   Labial Symmetry WFL   Labial Strength Reduced   Labial ROM Reduced right;Reduced left   Labial Sensation WFL   Facial Symmetry WFL   Facial Strength Reduced   Facial ROM Reduced right;Reduced left   Facial Sensation WFL   Lingual Symmetry WFL   Lingual Strength Mild reduced   Lingual ROM Moderate reduced   Lingual Sensation WFL   Velum WFL   Gag (did not assess )   Mandible WFL   Dentition Adequate   Tracheostomy No   Respirations 20   SpO2 95 %   Consistencies Assessed and Performance   Materials Admnistered Puree/Level 1;Soft/Level 3;Regular/Solid; Thin liquid   Materials Adminstered Comment see above  Oral Stage Mild impaired   Oral Stage Comment Prolonged oral preparation and transpot of all consistencies    Wife reports that this is baseline  Phargngeal Stage Mild impaired   Pharyngeal Stage Comment Mild delay in the initiation of the swallow, good laryngeal elevation, multiple swallows per bolus, no coughing or choking during or following the swallow on any consistency, clear voice quality following all swallows  Swallow Mechanics Mild delayed;Good Larygneal rise   Esophageal Concerns No s/s reported   Strategies and Efficacy single sips of liquid, smaller bites of solid  Summary   Swallow Summary Swallow skills are safe and WFL to begin a regular consistency diet with thin liquids  Recommendations   Risk for Aspiration Mild   Recommendations Consider oral diet; Dysphagia treatment   Diet Solid Recommendation Regular consistency   Diet Liquid Recommendation Thin liquid   Recommended Form of Meds As desired   General Precautions Aspiration precautions; Feed only when alert;Upright as possible for all oral intake;Remain upright for 45 mins after meals   Further Evaluations Neurology   Results Reviewed with RN;PT/Family/Caregiver   Treatment Recommendations   Duration of treatment 3-5 times per week  Follow up treatments Strategy training; Assure diet tolerance; Patient/family education   Dysphagia Goals Patient will tolerate recommended diet without observed clinical signs of oral/pharngeal dysphagia   Speech Therapy Prognosis   Prognosis Good   Prognosis Considerations Family/Community Support; Patient Participation Level; Family/Caregiver Participation Level; Availability of Services;Previous Level of Function     RAH Ch S , [address] [facility]  Speech-Language Pathologist  [id number]

## 2018-09-02 NOTE — ASSESSMENT & PLAN NOTE
· As per wife at bedside, patient was flossing his teeth and while they were there, patient was slow to respond and moving his right arm and she became concerned that he could not; she brought him out into the hallway and was examined by the nursing staff who did not appreciate an abnormality as per wife; sent him to the ER for further evaluation - upon evaluation in the ER, patient is at his baseline per wife  · Labs unremarkable, CT of the head with no acute finding  · Admit to telemetry  · Neuro checks q 4 hours  · Neuro evaluation in the AM  · Patient had a recent echo, will get carotid Dopplers, will defer to neuro regarding MRI  · Urinalysis pending

## 2018-09-02 NOTE — NURSING NOTE
Pt discharged per MD instructions  Report called to CCL to nurse Nhan Hart  Pt transferred via squad and discharge instructions sent with transport team   IV access removed with catheter intact  Pt tolerated well  Pt exited with wife at side and all belongings

## 2018-09-02 NOTE — ASSESSMENT & PLAN NOTE
· As per wife patient did not respond, right hand seemed floppy but this resolved on its own  Unclear if the patient just took a little longer to respond to his wife since he does have weakness at baseline vs momentary worsening of his baseline weakness as his symptoms were already better when seen by nursing home staff  · CT of the head with no acute finding  · Discussed case with Neurology on call - Dr Bonita Delgado and patient started on 81 mg of ASA  LDL of 89 and no need for statin as per him  · Neuro not available today and patient's wife wants him discharged as he is at his baseline rather than stay in the hospital for other pending tests  · Patient had a recent echo at [facility] as per wife    Get carotid Dopplers, MRI brain as outpatient  · Urinalysis neg for UTI

## 2018-09-02 NOTE — PLAN OF CARE
Problem: PHYSICAL THERAPY ADULT  Goal: Performs mobility at highest level of function for planned discharge setting  See evaluation for individualized goals  Treatment/Interventions: ADL retraining, Functional transfer training, LE strengthening/ROM, Therapeutic exercise, Endurance training, Cognitive reorientation, Patient/family training, Equipment eval/education, Bed mobility, Gait training, Spoke to nursing          See flowsheet documentation for full assessment, interventions and recommendations  Outcome: Progressing  Prognosis: Good  Problem List: Decreased strength, Decreased endurance, Decreased mobility, Impaired balance, Decreased coordination, Decreased safety awareness, Impaired judgement, Decreased cognition           Recommendation:  (STR)          See flowsheet documentation for full assessment

## 2018-09-02 NOTE — PHYSICAL THERAPY NOTE
PT EVALUATION       09/02/18 0810   Pain Assessment   Pain Assessment No/denies pain   Home Living   Type of Home (admitted from STR/CCL)   [address] [facility],[address]  (transport chair)   Prior Function   Level of [place] (amb with walker and assist at rehab 60 feet per wife)   Lives With (living with wife prior to hospitalization/STR) wife is very supportive/involved   Restrictions/Precautions   Other Precautions Cognitive; Chair Alarm; Bed Alarm; Fall Risk   General   Additional Pertinent History Pt admitted from STR with R arm weakness lasting a short time, now resolved  Pt had recent prolonged hosipitaliztion with diagnosis of NPH how with  shunt  Wife reports  shunt improved pt's symptoms but now worse again  Wife reports they are scheduled to have the shunt checked soon  Pt also has diagnosis of primary parkinsonism  Cognition   Overall Cognitive Status Impaired   Arousal/Participation Cooperative   Orientation Level Oriented to person;Disoriented to place; Disoriented to time;Disoriented to situation   Following Commands Follows one step commands inconsistently   Comments flat affect, soft voice   RLE Assessment   RLE Assessment (prefers hips/knees flexed;  MMT hip 3/5, knee 2/5, ankle 2/5)   LLE Assessment   LLE Assessment (prefers hips/knees flexed;  MMT hip 3/5, knee 2/5, ankle 2/5)   Bed Mobility   Supine to Sit 2  Maximal assistance   Transfers   Sit to Stand 2  Maximal assistance   Additional items Assist x 2  (with walker)   Stand to Sit 2  Maximal assistance   Additional items Assist x 2  (with walker)   Ambulation/Elevation   Gait pattern L Knee Ophelia;R Knee Ophelia; Poor UE support; Ataxia; Short stride  (flexed posture, narrow NAOMIE)   Gait Assistance 2  Maximal assist   Additional items Assist x 2   Assistive Device Rolling walker   Distance 6 feet   Balance   Static Sitting Fair   Dynamic Sitting Poor   Static Standing Poor   Dynamic Standing Poor   Activity Tolerance   Nurse Made Aware max assist x 2 to transfer to chair   Assessment   Prognosis Good   Problem List Decreased strength;Decreased endurance;Decreased mobility; Impaired balance;Decreased coordination;Decreased safety awareness; Impaired judgement;Decreased cognition   Goals   Patient Goals unable to state due to cognitive status   STG Expiration Date 09/09/18   Short Term Goal #1 improve bed mobility to mod assist, improve transfers bed to chair to max assist x 1, improve ambulation with walker to max assist x 12 feet, improve standing static balance to fair   LTG Expiration Date 09/16/18   Long Term Goal #1 improve bed mobility to min assist,  improve transfers to min assist with walker, improve ambulation with walker to mod assist x 75 feet    Long Term Goal #2 improve standing static balance to at least fair to decrease risk of falls, improve LE strength to at least 4/5   Treatment Day 1   Plan   Treatment/Interventions ADL retraining;Functional transfer training;LE strengthening/ROM; Therapeutic exercise; Endurance training;Cognitive reorientation;Patient/family training;Equipment eval/education; Bed mobility;Gait training;Spoke to nursing   PT Frequency 5x/wk   Recommendation   Recommendation (STR)   Barthel Index   Feeding 0   Bathing 0   Grooming Score 0   Dressing Score 0   Bladder Score 5   Bowels Score 10   Toilet Use Score 5   Transfers (Bed/Chair) Score 5   Mobility (Level Surface) Score 0   Stairs Score 0   Barthel Index Score 22   Licensure   [place] License Number  Torres Ontiveros PT [id number]       Time In:0800  Time NJN:[id number]  Total Time: 10      S:  Little verbalization, agreeable to activity  O:  Sit to stand with Max assist x 2 with UE support on walker  LEs/body flexed, unable stand erect even with max assist x 2  LE AAROM x 15 each ankle pumps, LAQ, hip flexion seated at the edge of the bed  A:  Pt is profoundly weak/has poor motor control    P:  Continue PT    Aurora Goncalves, PT [id number]

## 2018-09-02 NOTE — H&P
H&P- Darlene Amaya   [birth year], 69 y o  male MRN: [id number]    Unit/Bed#: [address] [place] [address] Encounter: [id number]    Primary Care Provider: Isamar Gross MD   Date and time admitted to hospital: 9/1/2018  8:44 PM        * Weakness   Assessment & Plan    · As per wife at bedside, patient was flossing his teeth and while they were there, patient was slow to respond and moving his right arm and she became concerned that he could not; she brought him out into the hallway and was examined by the nursing staff who did not appreciate an abnormality as per wife; sent him to the ER for further evaluation - upon evaluation in the ER, patient is at his baseline per wife  · Labs unremarkable, CT of the head with no acute finding  · Admit to telemetry  · Neuro checks q 4 hours  · Neuro evaluation in the AM  · Patient had a recent echo, will get carotid Dopplers, will defer to neuro regarding MRI  · Urinalysis pending        NPH (normal pressure hydrocephalus)   Assessment & Plan    · Status post  shunt in July  · Neurosurgery is Dr Liliana Foote from [facility] Neurosurgical specialist  · As per CT,  shunt is in place and no evidence of hydrocephalus at this time        Hypothyroidism   Assessment & Plan    Continue Synthroid        Hypertension   Assessment & Plan    · Continue losartan          VTE Prophylaxis:sequential compression device   Code Status: Level 1 - Full Code    Anticipated Length of Stay:  Patient will be admitted on an Observation basis with an anticipated length of stay of  less than 2 midnights  Justification for Hospital Stay: transient weakness    Total Time for Visit, including Counseling / Coordination of Care: 30 minutes  Greater than 50% of this total time spent on direct patient counseling and coordination of care      Chief Complaint:   Weakness - Generalized (pt sent in from NH wife told staff patients hand grasp was weaker than normal, started about 40 mins ago, wife states patient seems back to baseline now )      History of Present Illness:    Alexi Harding Sr  is a 69 y o  male with a PMH of NPH status post  shunt on July 30th, hypothyroidism, hypertension, Parkinson's who presents with weakness  Patient is at [facility] for rehabilitation following a prolonged hospitalization  This afternoon his wife was helping him floss when he stared at her and did not really respond when she asked him to take something at out of her hands  She became concerned that he was in understanding or where he was confused or he was unable to move his arm  She took him onto the hallway where he was examined by nursing staff with a complete neurological examination being normal as per the wife  He was brought into the the ER by EMS at his baseline mental status  CT of the head revealed a  shunt with no evidence of hydrocephalus  Labs were essentially unremarkable  Urinalysis is pending  Patient is admitted for neurology consultation  Review of Systems:    Review of Systems   HENT: Negative  Eyes: Negative  Respiratory: Negative  Cardiovascular: Negative  Gastrointestinal: Negative  Endocrine: Negative  Genitourinary: Negative  Musculoskeletal: Positive for gait problem  Allergic/Immunologic: Negative  Neurological: Positive for weakness  Hematological: Negative  Psychiatric/Behavioral: Negative  Past Medical and Surgical History:     Past Medical History:   Diagnosis Date    Disease of thyroid gland     Dysphagia     Hydrocephalus     Hypertension     Muscle weakness        Past Surgical History:   Procedure Laterality Date    CSF SHUNT      HERNIA REPAIR      x 2    LUMBAR LAMINECTOMY      ROTATOR CUFF REPAIR Left        Meds/Allergies:    Prior to Admission medications    Medication Sig Start Date End Date Taking?  Authorizing Provider   acetaminophen (TYLENOL) 325 mg tablet Take 650 mg by mouth every 6 (six) hours as needed for mild pain Yes Historical Provider, MD   cyanocobalamin 1000 MCG tablet Take 100 mcg by mouth daily   Yes Historical Provider, MD   liothyronine (CYTOMEL) 5 mcg tablet Take 5 mcg by mouth daily   Yes Historical Provider, MD   losartan (COZAAR) 25 mg tablet Take 25 mg by mouth daily   Yes Historical Provider, MD   saccharomyces boulardii (FLORASTOR) 250 mg capsule Take 250 mg by mouth 2 (two) times a day   Yes Historical Provider, MD   thyroid (ARMOUR) 32 5 MG tablet Take 32 5 mg by mouth daily   Yes Historical Provider, MD       Allergies: Allergies   Allergen Reactions    Propranolol        Social History:     Marital Status: /Civil Union   Substance Use History:   History   Alcohol Use No     History   Smoking Status    Never Smoker   Smokeless Tobacco    Never Used     History   Drug Use No       Family History:    non-contributory    Physical Exam:     Vitals:   Blood Pressure: (!) 183/86 (09/02/18 0113)  Pulse: 67 (09/02/18 0113)  Temperature: 98 °F (36 7 °C) (09/02/18 0113)  Temp Source: Axillary (09/02/18 0113)  Respirations: 20 (09/02/18 0113)  Height: 6' 1" (185 4 cm) (09/02/18 0113)  Weight - Scale: 63 5 kg (140 lb) (Simultaneous filing  User may not have seen previous data ) (09/01/18 2048)  SpO2: 98 % (09/02/18 0113)    Physical Exam   Constitutional: He is oriented to person, place, and time  He appears well-developed and well-nourished  HENT:   Head: Normocephalic and atraumatic  Eyes: Pupils are equal, round, and reactive to light  Neck: Normal range of motion  Neck supple  Cardiovascular: Normal rate, regular rhythm and normal heart sounds  Pulmonary/Chest: Effort normal and breath sounds normal    Abdominal: Soft  Bowel sounds are normal  He exhibits no distension  There is no tenderness  Neurological: He is alert and oriented to person, place, and time     4/5 strength to bilateral upper extremity  Three of 5 strength to bilateral lower extremities  Speech is slow but as per wife is at baseline  Patient is alert and oriented to person place and time  Wheelchair dependent except and PT at at the rehab facility  Coordination difficulties   Skin: Skin is warm and dry  Psychiatric: He has a normal mood and affect  Additional Data:     Lab Results: I have personally reviewed pertinent reports  Results from last 7 days  Lab Units 09/01/18  2105   WBC Thousand/uL 5 83   HEMOGLOBIN g/dL 13 0   HEMATOCRIT % 41 5   PLATELETS Thousands/uL 227   NEUTROS PCT % 73   LYMPHS PCT % 13*   MONOS PCT % 10   EOS PCT % 2       Results from last 7 days  Lab Units 09/01/18  2105   SODIUM mmol/L 140   POTASSIUM mmol/L 4 1   CHLORIDE mmol/L 105   CO2 mmol/L 32   BUN mg/dL 23   CREATININE mg/dL 1 11   CALCIUM mg/dL 9 1   ALK PHOS U/L 92   ALT U/L 25   AST U/L 21       Results from last 7 days  Lab Units 09/01/18  2105   INR  0 98       Imaging: I have personally reviewed pertinent reports  CT head without contrast   Final Result by Sarah Guzman MD (09/01 2214)      No acute intracranial abnormality  Workstation performed: [id number]         VAS carotid complete study (*Order only if CTA has not been completed*)    (Results Pending)       CT head without contrast   Final Result      No acute intracranial abnormality  Workstation performed: [id number]         VAS carotid complete study (*Order only if CTA has not been completed*)    (Results Pending)       EKG, Pathology, and Other Studies Reviewed on Admission:   · EKG: not applicable    Allscripts / Epic Records Reviewed: Yes     ** Please Note: This note has been constructed using a voice recognition system   **

## 2018-09-02 NOTE — SOCIAL WORK
DASH discussion completed  Discussed goals of making sure pt's needs are met upon discharge, pt's preferences are taken into account, pt understands her health condition, medications and symptoms to watch for after returning home and pt is aware of any follow up appointments recommended by hospital physician  CM spoke with the pt's wife at the bedside  Pt is currently doing STR at [address] and her intention is to return today for continued STR  Pt will need outpt Carotid studies to follow up in the next week and will have his scheduled Neurosurgery appointment on Wednesday  Dalia called, pt is Medicare Primary and noted that J[facility] Long is a Prescription plan only  Dalia aware of pt DC today  Referral out to [facility] and pt  time is 430-5pm tonight  Unit Rn and PCP is made aware of this

## 2018-09-02 NOTE — ASSESSMENT & PLAN NOTE
· Status post  shunt in July  · Neurosurgery is Dr Shea Gasca from [address] [place] [address] and has appointment on Wed    · As per CT,  shunt is in place and no evidence of hydrocephalus at this time

## 2018-09-03 LAB
ATRIAL RATE: 65 BPM
MRSA NOSE QL CULT: NORMAL
P AXIS: 22 DEGREES
PR INTERVAL: 160 MS
QRS AXIS: -34 DEGREES
QRSD INTERVAL: 78 MS
QT INTERVAL: 392 MS
QTC INTERVAL: 407 MS
T WAVE AXIS: 14 DEGREES
VENTRICULAR RATE: 65 BPM

## 2018-09-03 PROCEDURE — 93010 ELECTROCARDIOGRAM REPORT: CPT | Performed by: INTERNAL MEDICINE

## 2023-03-21 NOTE — CASE MANAGEMENT
Initial Clinical Review    Admission: Date/Time/Statement: 09/01/18 2333    Orders Placed This Encounter   Procedures    Place in Observation (expected length of stay for this patient is less than two midnights)     Standing Status:   Standing     Number of Occurrences:   1     Order Specific Question:   Admitting Physician     Answer:   Vinh Miranda     Order Specific Question:   Level of Care     Answer:   Med Surg [16]         ED: Date/Time/Mode of Arrival:   ED Arrival Information     Expected Arrival Acuity Means of Arrival Escorted By Service Admission Type    9/1/2018 20:44 9/1/2018 20:36 Emergent Ambulance [facility] General Medicine Emergency    Arrival Complaint    weakness          Chief Complaint:   Chief Complaint   Patient presents with    Weakness - Generalized     pt sent in from NH wife told staff patients hand grasp was weaker than normal, started about 40 mins ago, wife states patient seems back to baseline now  History of Illness:       Sergio Claire Sr  is a 69 y o  male with a PMH of NPH status post  shunt on July 30th, hypothyroidism, hypertension, Parkinson's who presents with weakness  This afternoon his wife was helping him floss when he stared at her and did not really respond when she asked him to take something at out of her hands  She became concerned that he was't understanding or where he was confused or he was unable to move his arm         ED Vital Signs:   Temperature Pulse Respirations Blood Pressure SpO2   09/01/18 2048 09/01/18 2045 09/01/18 2045 09/01/18 2048 09/01/18 2045   98 4 °F (36 9 °C) 72 21 (!) 187/86 98 %      No Pain       09/02/18 63 9 kg (140 lb 14 oz)       Vital Signs (abnormal):       Date/Time  Temp  Pulse  Resp  BP  SpO2  O2 Device   09/02/18 0943  --  --  --   175/90  --  --   09/02/18 0808  97 6 °F (36 4 °C)  75  20   192/93  95 %  None (Room air)   09/02/18 0430  98 6 °F (37 °C)  64  20  --  97 %  None (Room air) 09/02/18 0113  98 °F (36 7 °C)  67  20   183/86  98 %  None (Room air)   09/01/18 2130  --  70   25   176/79  99 %  --   09/01/18 2115  --  74  20   179/91  99 %  --   09/01/18 2110  --  --  --   179/91  --  --       Abnormal Labs/Diagnostic Test Results:       4/5 strength to bilateral upper extremity  Three of 5 strength to bilateral lower extremities  Speech is slow but as per wife is at baseline    Ct head no acute finding  Carotid complete study  Results pending     ED Treatment:   Medication Administration from 09/01/2018 2036 to 09/02/2018 0110     None          Past Medical History:    Disease of thyroid gland    Dysphagia    Hydrocephalus    Hypertension    Muscle weakness       Admitting Diagnosis: Weakness [R53 1]  S/P  shunt [Z98 2]  Generalized weakness [R53 1]  Assessment/Plan:     Age/Sex: 69 y o  male  NURSING HOME RESIDENT  WITH TRANSIENT WEAKNESS ADMITTED FOR NEUROLOGY CONSULT  AS PATIENT HAD  SHUNT IN July FOR NPH           Weakness   Assessment & Plan     · As per wife at bedside, patient was flossing his teeth and while they were there, patient was slow to respond and moving his right arm and she became concerned that he could not; she brought him out into the hallway and was examined by the nursing staff who did not appreciate an abnormality as per wife; sent him to the ER for further evaluation - upon evaluation in the ER, patient is at his baseline per wife  · Labs unremarkable, CT of the head with no acute finding  · Admit to telemetry  · Neuro checks q 4 hours  · Neuro evaluation in the AM  · Patient had a recent echo, will get carotid Dopplers, will defer to neuro regarding MRI  · Urinalysis pending          NPH (normal pressure hydrocephalus)   Assessment & Plan     · Status post  shunt in July  · Neurosurgery is Dr Sarah Monterroso from [facility] Neurosurgical specialist  · As per CT,  shunt is in place and no evidence of hydrocephalus at this time             Admission Orders:    TELEMETRY FOR CVA   NPO  PT OT AND SPEECH EVAL AND TREAT   CONSULT NEUROLOGY TIA   DYSPHAGIA ASSESSMENT  PRIOR TO PO   STROKE EDUCATION  NEURO CHECKS Q4 HR  CAROTID COMPLETE STUDY  SEQUENTIAL COMPRESSION DEVICE   Reason for not initiating IV thrombolytic: NIHSS score = 0   LIPID PANEL WITH LDL  HBA1C       Scheduled Meds:     acetaminophen 650 mg Oral Q6H PRN   atorvastatin 40 mg Oral QPM   cyanocobalamin 100 mcg Oral Daily   heparin (porcine) 5,000 Units Subcutaneous Q12H [address]   levothyroxine 50 mcg Oral Early Morning   liothyronine 5 mcg Oral Daily   losartan 25 mg Oral Daily   saccharomyces boulardii 250 mg Oral BID Detail Level: None